# Patient Record
Sex: MALE
[De-identification: names, ages, dates, MRNs, and addresses within clinical notes are randomized per-mention and may not be internally consistent; named-entity substitution may affect disease eponyms.]

---

## 2019-09-04 ENCOUNTER — HOSPITAL ENCOUNTER (EMERGENCY)
Dept: HOSPITAL 72 - EMR | Age: 54
Discharge: HOME | End: 2019-09-04
Payer: MEDICAID

## 2019-09-04 VITALS — HEIGHT: 68 IN | WEIGHT: 160 LBS | BODY MASS INDEX: 24.25 KG/M2

## 2019-09-04 VITALS — DIASTOLIC BLOOD PRESSURE: 86 MMHG | SYSTOLIC BLOOD PRESSURE: 136 MMHG

## 2019-09-04 VITALS — DIASTOLIC BLOOD PRESSURE: 61 MMHG | SYSTOLIC BLOOD PRESSURE: 100 MMHG

## 2019-09-04 DIAGNOSIS — I10: ICD-10-CM

## 2019-09-04 DIAGNOSIS — F10.920: Primary | ICD-10-CM

## 2019-09-04 DIAGNOSIS — R07.9: ICD-10-CM
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
## 2019-09-04 LAB
ADD MANUAL DIFF: NO
ALBUMIN SERPL-MCNC: 4.2 G/DL (ref 3.4–5)
ALBUMIN/GLOB SERPL: 1.1 {RATIO} (ref 1–2.7)
ALP SERPL-CCNC: 191 U/L (ref 46–116)
ALT SERPL-CCNC: 104 U/L (ref 12–78)
ANION GAP SERPL CALC-SCNC: 15 MMOL/L (ref 5–15)
AST SERPL-CCNC: 125 U/L (ref 15–37)
BASOPHILS NFR BLD AUTO: 1.2 % (ref 0–2)
BILIRUB SERPL-MCNC: 0.9 MG/DL (ref 0.2–1)
BUN SERPL-MCNC: 13 MG/DL (ref 7–18)
CALCIUM SERPL-MCNC: 8.7 MG/DL (ref 8.5–10.1)
CHLORIDE SERPL-SCNC: 112 MMOL/L (ref 98–107)
CO2 SERPL-SCNC: 24 MMOL/L (ref 21–32)
CREAT SERPL-MCNC: 0.7 MG/DL (ref 0.55–1.3)
EOSINOPHIL NFR BLD AUTO: 0.9 % (ref 0–3)
ERYTHROCYTE [DISTWIDTH] IN BLOOD BY AUTOMATED COUNT: 13.4 % (ref 11.6–14.8)
GLOBULIN SER-MCNC: 3.7 G/DL
HCT VFR BLD CALC: 47.9 % (ref 42–52)
HGB BLD-MCNC: 15.4 G/DL (ref 14.2–18)
LYMPHOCYTES NFR BLD AUTO: 36.4 % (ref 20–45)
MCV RBC AUTO: 83 FL (ref 80–99)
MONOCYTES NFR BLD AUTO: 5.2 % (ref 1–10)
NEUTROPHILS NFR BLD AUTO: 56.3 % (ref 45–75)
PLATELET # BLD: 173 K/UL (ref 150–450)
POTASSIUM SERPL-SCNC: 3.4 MMOL/L (ref 3.5–5.1)
RBC # BLD AUTO: 5.77 M/UL (ref 4.7–6.1)
SODIUM SERPL-SCNC: 151 MMOL/L (ref 136–145)
WBC # BLD AUTO: 10.4 K/UL (ref 4.8–10.8)

## 2019-09-04 PROCEDURE — 80053 COMPREHEN METABOLIC PANEL: CPT

## 2019-09-04 PROCEDURE — 84484 ASSAY OF TROPONIN QUANT: CPT

## 2019-09-04 PROCEDURE — 80307 DRUG TEST PRSMV CHEM ANLYZR: CPT

## 2019-09-04 PROCEDURE — 80329 ANALGESICS NON-OPIOID 1 OR 2: CPT

## 2019-09-04 PROCEDURE — 36415 COLL VENOUS BLD VENIPUNCTURE: CPT

## 2019-09-04 PROCEDURE — 96360 HYDRATION IV INFUSION INIT: CPT

## 2019-09-04 PROCEDURE — 71045 X-RAY EXAM CHEST 1 VIEW: CPT

## 2019-09-04 PROCEDURE — 93005 ELECTROCARDIOGRAM TRACING: CPT

## 2019-09-04 PROCEDURE — 83690 ASSAY OF LIPASE: CPT

## 2019-09-04 PROCEDURE — 85025 COMPLETE CBC W/AUTO DIFF WBC: CPT

## 2019-09-04 PROCEDURE — 99284 EMERGENCY DEPT VISIT MOD MDM: CPT

## 2019-09-04 RX ADMIN — SODIUM CHLORIDE, SODIUM LACTATE, POTASSIUM CHLORIDE, AND CALCIUM CHLORIDE SCH MLS/HR: .6; .31; .03; .02 INJECTION, SOLUTION INTRAVENOUS at 11:08

## 2019-09-04 RX ADMIN — SODIUM CHLORIDE, SODIUM LACTATE, POTASSIUM CHLORIDE, AND CALCIUM CHLORIDE SCH MLS/HR: .6; .31; .03; .02 INJECTION, SOLUTION INTRAVENOUS at 11:29

## 2019-09-04 RX ADMIN — SODIUM CHLORIDE, SODIUM LACTATE, POTASSIUM CHLORIDE, AND CALCIUM CHLORIDE SCH MLS/HR: .6; .31; .03; .02 INJECTION, SOLUTION INTRAVENOUS at 12:30

## 2019-09-04 NOTE — NUR
ED Nurse Note:

Patient initially changed into gown and attached to patient monitor on arrival. 
EKG obtained and IV access and labs drawn.

## 2019-09-04 NOTE — EMERGENCY ROOM REPORT
History of Present Illness


General


Chief Complaint:  Alcohol Intoxication


Source:  Patient, Medical Record, EMS





Present Illness


HPI


54-year-old male denies any past medical history no surgical history presents 

with acute alcohol intoxication, patient states he also has chest ache when he 

drinks, mild severity, constant has been ongoing for 4 days, patient denies any 

dyspnea on exertion no shortness of breath no diaphoresis, no radiation of the 

pain, no abdominal pain, patient is smiling when he is giving the history.


Allergies:  


Coded Allergies:  


     No Known Allergies (Unverified , 7/7/13)





Patient History


Limited by:  medical condition - Currently intoxicated


Past Medical History:  see triage record


Social History:  Reports: alcohol use


Reviewed Nursing Documentation:  PMH: Agreed; PSxH: Agreed





Nursing Documentation-PMH


Past Medical History:  No History, Except For


Hx Hypertension:  Yes


Hx Pacemaker:  No


Hx Asthma:  No


Hx COPD:  No


Hx Diabetes:  No


Hx Cancer:  No


Hx Gastrointestinal Problems:  No


Hx Dialysis:  No


Hx Neurological Problems:  No


Hx Cerebrovascular Accident:  No


Hx Seizures:  No





Review of Systems


All Other Systems:  limited - Currently intoxicated





Physical Exam





Vital Signs








  Date Time  Temp Pulse Resp B/P (MAP) Pulse Ox O2 Delivery O2 Flow Rate FiO2


 


9/4/19 10:09 98.1 100 18 130/85 (100) 94 Room Air  








Sp02 EP Interpretation:  reviewed, normal


General Appearance:  well appearing, no apparent distress, alert


Head:  normocephalic, atraumatic


Eyes:  bilateral eye PERRL, bilateral eye EOMI


ENT:  uvula midline, moist mucus membranes


Neck:  supple, thyroid normal, supple/symm/no masses


Respiratory:  lungs clear, no respiratory distress, no retraction, no accessory 

muscle use


Cardiovascular #1:  normal peripheral pulses, regular rate, rhythm, no edema, 

no gallop, no murmur


Gastrointestinal:  non tender, soft, no guarding, no rebound


Musculoskeletal:  normal inspection


Neurologic:  alert, oriented x3, other - Slurring his speech and smiling


Psychiatric:  mood/affect normal


Skin:  no rash, warm/dry





Medical Decision Making


Diagnostic Impression:  


 Primary Impression:  


 Acute alcoholic intoxication


 Qualified Codes:  F10.920 - Alcohol use, unspecified with intoxication, 

uncomplicated


ER Course


Patient presents with acute alcohol intoxication


Will rehydrate patient will obtain labs


Labs show elevated alcohol level, transaminitis consistent with alcohol abuse


Reevaluation 2:25 PM, patient is sober, steady gait patient is eating a 

sandwich requesting food


Disposition home with return precautions





Laboratory Tests








Test


  9/4/19


10:45 9/4/19


12:41


 


White Blood Count


  10.4 K/UL


(4.8-10.8) 


 


 


Red Blood Count


  5.77 M/UL


(4.70-6.10) 


 


 


Hemoglobin


  15.4 G/DL


(14.2-18.0) 


 


 


Hematocrit


  47.9 %


(42.0-52.0) 


 


 


Mean Corpuscular Volume 83 FL (80-99)   


 


Mean Corpuscular Hemoglobin


  26.7 PG


(27.0-31.0)  L 


 


 


Mean Corpuscular Hemoglobin


Concent 32.2 G/DL


(32.0-36.0) 


 


 


Red Cell Distribution Width


  13.4 %


(11.6-14.8) 


 


 


Platelet Count


  173 K/UL


(150-450) 


 


 


Mean Platelet Volume


  5.6 FL


(6.5-10.1)  L 


 


 


Neutrophils (%) (Auto)


  56.3 %


(45.0-75.0) 


 


 


Lymphocytes (%) (Auto)


  36.4 %


(20.0-45.0) 


 


 


Monocytes (%) (Auto)


  5.2 %


(1.0-10.0) 


 


 


Eosinophils (%) (Auto)


  0.9 %


(0.0-3.0) 


 


 


Basophils (%) (Auto)


  1.2 %


(0.0-2.0) 


 


 


Sodium Level


  151 MMOL/L


(136-145)  H 


 


 


Potassium Level


  3.4 MMOL/L


(3.5-5.1)  L 


 


 


Chloride Level


  112 MMOL/L


()  H 


 


 


Carbon Dioxide Level


  24 MMOL/L


(21-32) 


 


 


Anion Gap


  15 mmol/L


(5-15) 


 


 


Blood Urea Nitrogen


  13 mg/dL


(7-18) 


 


 


Creatinine


  0.7 MG/DL


(0.55-1.30) 


 


 


Estimate Glomerular


Filtration Rate > 60 mL/min


(>60) 


 


 


Glucose Level


  66 MG/DL


()  L 


 


 


Calcium Level


  8.7 MG/DL


(8.5-10.1) 


 


 


Total Bilirubin


  0.9 MG/DL


(0.2-1.0) 


 


 


Aspartate Amino Transferase


(AST) 125 U/L


(15-37)  H 


 


 


Alanine Aminotransferase (ALT)


  104 U/L


(12-78)  H 


 


 


Alkaline Phosphatase


  191 U/L


()  H 


 


 


Troponin I


  0.006 ng/mL


(0.000-0.056) 


 


 


Total Protein


  7.9 G/DL


(6.4-8.2) 


 


 


Albumin


  4.2 G/DL


(3.4-5.0) 


 


 


Globulin 3.7 g/dL   


 


Albumin/Globulin Ratio 1.1 (1.0-2.7)   


 


Lipase


  99 U/L


() 


 


 


Serum Alcohol 341 mg/dL   


 


Urine Opiates Screen


  


  Negative


(NEGATIVE)


 


Urine Barbiturates Screen


  


  Negative


(NEGATIVE)


 


Phencyclidine (PCP) Screen


  


  Negative


(NEGATIVE)


 


Urine Amphetamines Screen


  


  Negative


(NEGATIVE)


 


Urine Benzodiazepines Screen


  


  Negative


(NEGATIVE)


 


Urine Cocaine Screen


  


  Negative


(NEGATIVE)


 


Urine Marijuana (THC) Screen


  


  Negative


(NEGATIVE)








EKG Diagnostic Results


EKG Time:  10:25


EP Interpretation:  Sinus tachycardia, rate 102, QTc 448, no acute ST elevations

, normal axis





Rhythm Strip Diag. Results


Rhythm Strip Time:  10:45


EP Interpretation:  yes


Rate:  103


Rhythm:  no PVC's, no ectopy, other - Sinus tachycardia





Chest X-Ray Diagnostic Results


Chest X-Ray Diagnostic Results :  


   Chest X-Ray Ordered:  Yes


   # of Views/Limited/Complete:  1 View


   Indication:  Chest Pain


   EP Interpretation:  Yes


   Interpretation:  no consolidation, no effusion, no pneumothorax, no acute 

cardiopulmonary disease


   Impression:  No acute disease


   Electronically Signed by:  Zeyad Hernandez MD





Last Vital Signs








  Date Time  Temp Pulse Resp B/P (MAP) Pulse Ox O2 Delivery O2 Flow Rate FiO2


 


9/4/19 10:09 98.1 100 18 130/85 (100) 94 Room Air  








Disposition:  HOME, SELF-CARE


Condition:  Stable


Referrals:  


[facility]





[facility]


Patient Instructions:  Alcohol Intoxication, Easy-to-Read





Additional Instructions:  


The patient was provided with discharge instructions, notified to follow-up 

with a primary care doctor and or specialist in the next 24-48 hours, and to 

return to the ED if they have worsening of their symptoms. 





Please note that this report is being documented using DRAGON technology.


This can lead to erroneous entry secondary to incorrect interpretation by the 

dictating instrument.











Zeyad Hernandez MD Sep 4, 2019 10:46

## 2019-09-04 NOTE — DIAGNOSTIC IMAGING REPORT
Indication: Chest pain

 

Technique: One view of the chest

 

Comparison: 12/22/2013

 

Findings: Patient is rotated to the right. Lungs and pleural spaces are clear. The

heart size is normal. No significant interim change

 

Impression: No acute process

## 2019-09-04 NOTE — NUR
ER DISCHARGE NOTE: Regarding discharge at time of recorded discharge

Patient is cleared to be discharged per ERMD, pt is aox4, on room air, with 
stable vital signs. pt was given dc instructions, pt was able to verbalize 
understanding, pt id band and iv site removed without complications. pt is able 
to ambulate with steady gait. pt took all belongings. Patient did not wish to 
complete and sign the mini cog or the homless discharge waiver.

## 2019-09-04 NOTE — NUR
Homeless Discharge: entry re discharge at 14.51



Patient is being discharged from medical care.  Awake, alert and oriented x4.  
After care instructions, including referral to community resources were given.  
Patient verbalized understanding of After care instructions; at this time 
patient does not request medications, equipment or placement.  Patient signed 
patient consent in the medical record for patient destination upon discharge.  
All medical devices such as IV and ID band were removed.  Patient ambulated out 
with all personal belongings with steady gait. Patient refused to complete the 
Mini cog - same documeted and added to filing tray- patient label added to 
homeless log. Clothing provided from the clothing supplies - patient accepted 
jeans.

## 2019-09-04 NOTE — NUR
ED Nurse Note:



Patient taken to bed 9 by triage nurse. Walked to bed. Patient states he has 
pain 10/10 in both the legs. No other areas of pain identified. He advised 
nothing makes the pain worse or better. Spenish speaker predominantly but able 
to speak English. Smell of alcohol present in conversation.